# Patient Record
Sex: FEMALE | Race: WHITE | NOT HISPANIC OR LATINO | Employment: UNEMPLOYED | URBAN - METROPOLITAN AREA
[De-identification: names, ages, dates, MRNs, and addresses within clinical notes are randomized per-mention and may not be internally consistent; named-entity substitution may affect disease eponyms.]

---

## 2023-03-14 LAB
ALANINE AMINOTRANSFERASE (SGPT) (U/L) IN SER/PLAS: 34 U/L (ref 7–45)
ALBUMIN (G/DL) IN SER/PLAS: 3.2 G/DL (ref 3.4–5)
ALKALINE PHOSPHATASE (U/L) IN SER/PLAS: 128 U/L (ref 33–110)
ANION GAP IN SER/PLAS: 10 MMOL/L (ref 10–20)
ASPARTATE AMINOTRANSFERASE (SGOT) (U/L) IN SER/PLAS: 24 U/L (ref 9–39)
BILIRUBIN TOTAL (MG/DL) IN SER/PLAS: 0.3 MG/DL (ref 0–1.2)
CALCIUM (MG/DL) IN SER/PLAS: 9.3 MG/DL (ref 8.6–10.3)
CARBON DIOXIDE, TOTAL (MMOL/L) IN SER/PLAS: 23 MMOL/L (ref 21–32)
CHLORIDE (MMOL/L) IN SER/PLAS: 107 MMOL/L (ref 98–107)
CREATININE (MG/DL) IN SER/PLAS: 0.5 MG/DL (ref 0.5–1.05)
ERYTHROCYTE DISTRIBUTION WIDTH (RATIO) BY AUTOMATED COUNT: 14.2 % (ref 11.5–14.5)
ERYTHROCYTE MEAN CORPUSCULAR HEMOGLOBIN CONCENTRATION (G/DL) BY AUTOMATED: 31.8 G/DL (ref 32–36)
ERYTHROCYTE MEAN CORPUSCULAR VOLUME (FL) BY AUTOMATED COUNT: 87 FL (ref 80–100)
ERYTHROCYTES (10*6/UL) IN BLOOD BY AUTOMATED COUNT: 3.87 X10E12/L (ref 4–5.2)
GFR FEMALE: >90 ML/MIN/1.73M2
GLUCOSE (MG/DL) IN SER/PLAS: 70 MG/DL (ref 74–99)
HEMATOCRIT (%) IN BLOOD BY AUTOMATED COUNT: 33.6 % (ref 36–46)
HEMOGLOBIN (G/DL) IN BLOOD: 10.7 G/DL (ref 12–16)
LEUKOCYTES (10*3/UL) IN BLOOD BY AUTOMATED COUNT: 11.2 X10E9/L (ref 4.4–11.3)
PLATELETS (10*3/UL) IN BLOOD AUTOMATED COUNT: 282 X10E9/L (ref 150–450)
POTASSIUM (MMOL/L) IN SER/PLAS: 4.1 MMOL/L (ref 3.5–5.3)
PROTEIN TOTAL: 6.6 G/DL (ref 6.4–8.2)
SODIUM (MMOL/L) IN SER/PLAS: 136 MMOL/L (ref 136–145)
UREA NITROGEN (MG/DL) IN SER/PLAS: 7 MG/DL (ref 6–23)

## 2023-03-17 LAB — BILE ACID: 14 UMOL/L (ref 0–10)

## 2023-03-23 LAB
GROUP B STREP SCREEN: NORMAL
Lab: 1.6 UMOL/L
Lab: 1.7 UMOL/L
Lab: 6.6 UMOL/L
Lab: 9.8 UMOL/L

## 2023-11-21 ENCOUNTER — OFFICE VISIT (OUTPATIENT)
Dept: PRIMARY CARE | Facility: CLINIC | Age: 28
End: 2023-11-21
Payer: COMMERCIAL

## 2023-11-21 VITALS
OXYGEN SATURATION: 100 % | WEIGHT: 222 LBS | DIASTOLIC BLOOD PRESSURE: 68 MMHG | TEMPERATURE: 98.1 F | BODY MASS INDEX: 36.99 KG/M2 | HEART RATE: 71 BPM | SYSTOLIC BLOOD PRESSURE: 122 MMHG | HEIGHT: 65 IN

## 2023-11-21 DIAGNOSIS — Z13.29 SCREENING FOR THYROID DISORDER: ICD-10-CM

## 2023-11-21 DIAGNOSIS — Z13.21 ENCOUNTER FOR VITAMIN DEFICIENCY SCREENING: ICD-10-CM

## 2023-11-21 DIAGNOSIS — Z13.1 SCREENING FOR DIABETES MELLITUS: ICD-10-CM

## 2023-11-21 DIAGNOSIS — Z13.220 SCREENING FOR CHOLESTEROL LEVEL: ICD-10-CM

## 2023-11-21 DIAGNOSIS — F32.1 CURRENT MODERATE EPISODE OF MAJOR DEPRESSIVE DISORDER WITHOUT PRIOR EPISODE (MULTI): ICD-10-CM

## 2023-11-21 DIAGNOSIS — E66.09 CLASS 2 OBESITY DUE TO EXCESS CALORIES WITH BODY MASS INDEX (BMI) OF 36.0 TO 36.9 IN ADULT, UNSPECIFIED WHETHER SERIOUS COMORBIDITY PRESENT: Primary | ICD-10-CM

## 2023-11-21 PROCEDURE — 99214 OFFICE O/P EST MOD 30 MIN: CPT | Performed by: FAMILY MEDICINE

## 2023-11-21 PROCEDURE — 1036F TOBACCO NON-USER: CPT | Performed by: FAMILY MEDICINE

## 2023-11-21 PROCEDURE — 3008F BODY MASS INDEX DOCD: CPT | Performed by: FAMILY MEDICINE

## 2023-11-21 RX ORDER — FLUVOXAMINE MALEATE 25 MG/1
TABLET ORAL
Qty: 40 TABLET | Refills: 0 | Status: SHIPPED | OUTPATIENT
Start: 2023-11-21 | End: 2023-12-21 | Stop reason: WASHOUT

## 2023-11-21 ASSESSMENT — PATIENT HEALTH QUESTIONNAIRE - PHQ9
3. TROUBLE FALLING OR STAYING ASLEEP OR SLEEPING TOO MUCH: NEARLY EVERY DAY
2. FEELING DOWN, DEPRESSED OR HOPELESS: NEARLY EVERY DAY
10. IF YOU CHECKED OFF ANY PROBLEMS, HOW DIFFICULT HAVE THESE PROBLEMS MADE IT FOR YOU TO DO YOUR WORK, TAKE CARE OF THINGS AT HOME, OR GET ALONG WITH OTHER PEOPLE: VERY DIFFICULT
1. LITTLE INTEREST OR PLEASURE IN DOING THINGS: NEARLY EVERY DAY
9. THOUGHTS THAT YOU WOULD BE BETTER OFF DEAD, OR OF HURTING YOURSELF: NOT AT ALL
SUM OF ALL RESPONSES TO PHQ QUESTIONS 1-9: 17
7. TROUBLE CONCENTRATING ON THINGS, SUCH AS READING THE NEWSPAPER OR WATCHING TELEVISION: NOT AT ALL
6. FEELING BAD ABOUT YOURSELF - OR THAT YOU ARE A FAILURE OR HAVE LET YOURSELF OR YOUR FAMILY DOWN: MORE THAN HALF THE DAYS
8. MOVING OR SPEAKING SO SLOWLY THAT OTHER PEOPLE COULD HAVE NOTICED. OR THE OPPOSITE, BEING SO FIGETY OR RESTLESS THAT YOU HAVE BEEN MOVING AROUND A LOT MORE THAN USUAL: NOT AT ALL
SUM OF ALL RESPONSES TO PHQ9 QUESTIONS 1 AND 2: 6
5. POOR APPETITE OR OVEREATING: NEARLY EVERY DAY
4. FEELING TIRED OR HAVING LITTLE ENERGY: NEARLY EVERY DAY

## 2023-11-21 NOTE — PROGRESS NOTES
Assessment     ASSESSMENT/PLAN:      Problem List Items Addressed This Visit    None  Visit Diagnoses       Class 2 obesity due to excess calories with body mass index (BMI) of 36.0 to 36.9 in adult, unspecified whether serious comorbidity present    -  Primary    Encounter for vitamin deficiency screening        Relevant Orders    Vitamin D 25-Hydroxy,Total (for eval of Vitamin D levels)    Screening for diabetes mellitus        Relevant Orders    Basic Metabolic Panel    Hemoglobin A1C    Screening for thyroid disorder        Relevant Orders    TSH with reflex to Free T4 if abnormal    Screening for cholesterol level        Relevant Orders    Lipid Panel    Current moderate episode of major depressive disorder without prior episode (CMS/[facility])        Relevant Medications    fLuvoxaMINE (Luvox) 25 mg tablet            Patient Instructions:  Patient Instructions   If you are experiencing thoughts of hurting yourself or hurting someone else please use the following resources:   Crisis  Hotline (057) 822-0640  [facility] Crisis Intervention and Recovery Center [phone]  [facility] Suicide & Crisis Lifeline - 9-8-8 or 1-681.629.3941 (TALK)  [address] First Call for Help -   2-1-1, National [place]  on Mental Illness - (606) 339-5444 info@[place].org  24/7 Peer Support Warmline: 777.414.6652  Crisis Text Line: Text keyword 4hnjm to [id number]         Signed by: Bassem Umaña DO       FUTURE DIRECTION:   []    Subjective   SUBJECTIVE:     HPI : Patient is a 28 y.o. female who presents today for the following:     Mood  -States that she has gained significant amount of weight after previous pregnancy   - states she is 7 month postpartum   - mentions binge eating, overall has poor diet   - she feels like things are very difficulty for her and she feels overwhelmed       Review of Systems    Past Medical History:   Diagnosis Date    Candidiasis, unspecified 03/26/2021    Yeast  infection    Encounter for contraceptive management, unspecified 03/10/2020    Contraception management    Encounter for immunization 10/22/2019    Encounter for vaccination    Encounter for routine postpartum follow-up 03/10/2020    Postpartum exam    Encounter for supervision of normal pregnancy, unspecified, first trimester 2022    First trimester pregnancy    Hemorrhage in early pregnancy, unspecified 2022    Vaginal bleeding affecting early pregnancy    Other prurigo 2019    Pruritic rash    Other specified health status     No pertinent past medical history    Personal history of other complications of pregnancy, childbirth and the puerperium 2022    History of threatened     Personal history of other specified conditions 2022    History of headache    Personal history of other specified conditions 10/22/2019    History of urinary frequency    Personal history of urinary (tract) infections 10/21/2019    History of urinary tract infection        Past Surgical History:   Procedure Laterality Date    OTHER SURGICAL HISTORY  10/21/2019    [place] tooth extraction    OTHER SURGICAL HISTORY  2022     section low transverse        Current Outpatient Medications   Medication Instructions    fLuvoxaMINE (Luvox) 25 mg tablet Take 1 tablet daily for 1 week then increase to 2 tablet daily        No Known Allergies     Social History     Socioeconomic History    Marital status:      Spouse name: Not on file    Number of children: Not on file    Years of education: Not on file    Highest education level: Not on file   Occupational History    Not on file   Tobacco Use    Smoking status: Never    Smokeless tobacco: Never   Substance and Sexual Activity    Alcohol use: Not on file    Drug use: Not on file    Sexual activity: Not on file   Other Topics Concern    Not on file   Social History Narrative    Not on file     Social Determinants of Health     Financial  "Resource Strain: Not on file   Food Insecurity: Not on file   Transportation Needs: Not on file   Physical Activity: Not on file   Stress: Not on file   Social Connections: Not on file   Intimate Partner Violence: Not on file   Housing Stability: Not on file        No family history on file.     Objective     OBJECTIVE:     Vitals:    11/21/23 1607   BP: 122/68   Pulse: 71   Temp: 36.7 °C (98.1 °F)   SpO2: 100%   Weight: 101 kg (222 lb)   Height: 1.651 m (5' 5\")        Physical Exam  Constitutional:       Appearance: Normal appearance.   HENT:      Head: Normocephalic.   Pulmonary:      Effort: Pulmonary effort is normal.   Musculoskeletal:      Cervical back: Normal range of motion.   Neurological:      Mental Status: She is alert.   Psychiatric:         Mood and Affect: Mood is depressed. Affect is tearful.         Over the past 2 weeks, how often have you been bothered by any of the following problems?  Little interest or pleasure in doing things: Nearly every day  Feeling down, depressed, or hopeless: Nearly every day  Trouble falling or staying asleep, or sleeping too much: Nearly every day  Feeling tired or having little energy: Nearly every day  Poor appetite or overeating: Nearly every day  Feeling bad about yourself - or that you are a failure or have let yourself or your family down: More than half the days  Trouble concentrating on things, such as reading the newspaper or watching television: Not at all  Moving or speaking so slowly that other people could have noticed? Or the opposite - being so fidgety or restless that you have been moving around a lot more than usual.: Not at all  Thoughts that you would be better off dead or hurting yourself in some way: Not at all  Patient Health Questionnaire-9 Score: 17       "

## 2023-11-21 NOTE — PATIENT INSTRUCTIONS
If you are experiencing thoughts of hurting yourself or hurting someone else please use the following resources:   Crisis  Hotline (872) [phone]  [facility] Crisis Intervention and Recovery Center ([phone]  [employer] Suicide & Crisis Lifeline - 9-8-8 or 1-686.877.4721 (TALK)  [facility] First Call for Help -   2-1-1, National [place]  on Mental Illness - (441) 535-3947 info@[place].org  24/7 Peer Support Warmline: 831.523.8408  Crisis Text Line: Text keyword 4hope to 512061

## 2023-11-28 ENCOUNTER — APPOINTMENT (OUTPATIENT)
Dept: PRIMARY CARE | Facility: CLINIC | Age: 28
End: 2023-11-28
Payer: COMMERCIAL

## 2023-12-12 DIAGNOSIS — F32.1 CURRENT MODERATE EPISODE OF MAJOR DEPRESSIVE DISORDER WITHOUT PRIOR EPISODE (MULTI): ICD-10-CM

## 2023-12-12 RX ORDER — FLUVOXAMINE MALEATE 25 MG/1
TABLET ORAL
Qty: 40 TABLET | Refills: 0 | OUTPATIENT
Start: 2023-12-12

## 2023-12-21 ENCOUNTER — OFFICE VISIT (OUTPATIENT)
Dept: PRIMARY CARE | Facility: CLINIC | Age: 28
End: 2023-12-21
Payer: COMMERCIAL

## 2023-12-21 ENCOUNTER — LAB (OUTPATIENT)
Dept: LAB | Facility: LAB | Age: 28
End: 2023-12-21
Payer: COMMERCIAL

## 2023-12-21 VITALS
HEART RATE: 71 BPM | WEIGHT: 221 LBS | SYSTOLIC BLOOD PRESSURE: 114 MMHG | TEMPERATURE: 97.6 F | OXYGEN SATURATION: 97 % | DIASTOLIC BLOOD PRESSURE: 62 MMHG | BODY MASS INDEX: 36.78 KG/M2

## 2023-12-21 DIAGNOSIS — F32.1 CURRENT MODERATE EPISODE OF MAJOR DEPRESSIVE DISORDER WITHOUT PRIOR EPISODE (MULTI): Primary | ICD-10-CM

## 2023-12-21 DIAGNOSIS — E66.09 CLASS 2 OBESITY DUE TO EXCESS CALORIES WITH BODY MASS INDEX (BMI) OF 36.0 TO 36.9 IN ADULT, UNSPECIFIED WHETHER SERIOUS COMORBIDITY PRESENT: ICD-10-CM

## 2023-12-21 DIAGNOSIS — Z13.1 SCREENING FOR DIABETES MELLITUS: ICD-10-CM

## 2023-12-21 DIAGNOSIS — Z13.21 ENCOUNTER FOR VITAMIN DEFICIENCY SCREENING: ICD-10-CM

## 2023-12-21 DIAGNOSIS — Z13.29 SCREENING FOR THYROID DISORDER: ICD-10-CM

## 2023-12-21 DIAGNOSIS — Z13.220 SCREENING FOR CHOLESTEROL LEVEL: ICD-10-CM

## 2023-12-21 LAB
25(OH)D3 SERPL-MCNC: 14 NG/ML (ref 30–100)
ANION GAP SERPL CALC-SCNC: 10 MMOL/L (ref 10–20)
BUN SERPL-MCNC: 8 MG/DL (ref 6–23)
CALCIUM SERPL-MCNC: 9.3 MG/DL (ref 8.6–10.3)
CHLORIDE SERPL-SCNC: 106 MMOL/L (ref 98–107)
CHOLEST SERPL-MCNC: 156 MG/DL (ref 0–199)
CHOLESTEROL/HDL RATIO: 3.6
CO2 SERPL-SCNC: 28 MMOL/L (ref 21–32)
CREAT SERPL-MCNC: 0.66 MG/DL (ref 0.5–1.05)
GFR SERPL CREATININE-BSD FRML MDRD: >90 ML/MIN/1.73M*2
GLUCOSE SERPL-MCNC: 89 MG/DL (ref 74–99)
HDLC SERPL-MCNC: 43.9 MG/DL
LDLC SERPL CALC-MCNC: 77 MG/DL
NON HDL CHOLESTEROL: 112 MG/DL (ref 0–149)
POTASSIUM SERPL-SCNC: 4.5 MMOL/L (ref 3.5–5.3)
SODIUM SERPL-SCNC: 139 MMOL/L (ref 136–145)
T4 FREE SERPL-MCNC: 0.64 NG/DL (ref 0.61–1.12)
TRIGL SERPL-MCNC: 176 MG/DL (ref 0–149)
TSH SERPL-ACNC: 4.46 MIU/L (ref 0.44–3.98)
VLDL: 35 MG/DL (ref 0–40)

## 2023-12-21 PROCEDURE — 1036F TOBACCO NON-USER: CPT | Performed by: FAMILY MEDICINE

## 2023-12-21 PROCEDURE — 3008F BODY MASS INDEX DOCD: CPT | Performed by: FAMILY MEDICINE

## 2023-12-21 PROCEDURE — 84439 ASSAY OF FREE THYROXINE: CPT

## 2023-12-21 PROCEDURE — 83036 HEMOGLOBIN GLYCOSYLATED A1C: CPT

## 2023-12-21 PROCEDURE — 80061 LIPID PANEL: CPT

## 2023-12-21 PROCEDURE — 80048 BASIC METABOLIC PNL TOTAL CA: CPT

## 2023-12-21 PROCEDURE — 82306 VITAMIN D 25 HYDROXY: CPT

## 2023-12-21 PROCEDURE — 99213 OFFICE O/P EST LOW 20 MIN: CPT | Performed by: FAMILY MEDICINE

## 2023-12-21 PROCEDURE — 36415 COLL VENOUS BLD VENIPUNCTURE: CPT

## 2023-12-21 PROCEDURE — 84443 ASSAY THYROID STIM HORMONE: CPT

## 2023-12-21 RX ORDER — FLUVOXAMINE MALEATE 50 MG/1
50 TABLET, FILM COATED ORAL DAILY
Qty: 90 TABLET | Refills: 0 | Status: SHIPPED | OUTPATIENT
Start: 2023-12-21 | End: 2024-03-21 | Stop reason: DRUGHIGH

## 2023-12-21 ASSESSMENT — PATIENT HEALTH QUESTIONNAIRE - PHQ9
2. FEELING DOWN, DEPRESSED OR HOPELESS: SEVERAL DAYS
SUM OF ALL RESPONSES TO PHQ9 QUESTIONS 1 AND 2: 2
1. LITTLE INTEREST OR PLEASURE IN DOING THINGS: SEVERAL DAYS

## 2023-12-21 NOTE — PROGRESS NOTES
Assessment     ASSESSMENT/PLAN:      Problem List Items Addressed This Visit    None  Visit Diagnoses       Current moderate episode of major depressive disorder without prior episode (CMS/[facility])    -  Primary    Relevant Medications    fLuvoxaMINE (Luvox) 50 mg tablet    Class 2 obesity due to excess calories with body mass index (BMI) of 36.0 to 36.9 in adult, unspecified whether serious comorbidity present              Patient Instructions:  Patient Instructions   Continue Luvox 50mg, take with food   Add meat to plantbased meals   Continue exercising         Signed by: Bassem Umaña DO       FUTURE DIRECTION:   []    Subjective   SUBJECTIVE:     HPI : Patient is a 28 y.o. female who presents today for the following:     Depression   - mood has improved since being on luvox, did notice nausea when taking it at night       Review of Systems    Past Medical History:   Diagnosis Date    Candidiasis, unspecified 2021    Yeast infection    Encounter for contraceptive management, unspecified 03/10/2020    Contraception management    Encounter for immunization 10/22/2019    Encounter for vaccination    Encounter for routine postpartum follow-up 03/10/2020    Postpartum exam    Encounter for supervision of normal pregnancy, unspecified, first trimester 2022    First trimester pregnancy    Hemorrhage in early pregnancy, unspecified 2022    Vaginal bleeding affecting early pregnancy    Other prurigo 2019    Pruritic rash    Other specified health status     No pertinent past medical history    Personal history of other complications of pregnancy, childbirth and the puerperium 2022    History of threatened     Personal history of other specified conditions 2022    History of headache    Personal history of other specified conditions 10/22/2019    History of urinary frequency    Personal history of urinary (tract) infections 10/21/2019    History of urinary tract infection         Past Surgical History:   Procedure Laterality Date    OTHER SURGICAL HISTORY  10/21/2019    [place] tooth extraction    OTHER SURGICAL HISTORY  2022     section low transverse        Current Outpatient Medications   Medication Instructions    fLuvoxaMINE (LUVOX) 50 mg, oral, Daily        No Known Allergies     Social History     Socioeconomic History    Marital status:      Spouse name: Not on file    Number of children: Not on file    Years of education: Not on file    Highest education level: Not on file   Occupational History    Not on file   Tobacco Use    Smoking status: Never    Smokeless tobacco: Never   Substance and Sexual Activity    Alcohol use: Not on file    Drug use: Not on file    Sexual activity: Not on file   Other Topics Concern    Not on file   Social History Narrative    Not on file     Social Determinants of Health     Financial Resource Strain: Not on file   Food Insecurity: Not on file   Transportation Needs: Not on file   Physical Activity: Not on file   Stress: Not on file   Social Connections: Not on file   Intimate Partner Violence: Not on file   Housing Stability: Not on file        No family history on file.     Objective     OBJECTIVE:     Vitals:    23 0951   BP: 114/62   Pulse: 71   Temp: 36.4 °C (97.6 °F)   SpO2: 97%   Weight: 100 kg (221 lb)        Physical Exam  Constitutional:       Appearance: Normal appearance.   HENT:      Head: Normocephalic.   Pulmonary:      Effort: Pulmonary effort is normal.   Musculoskeletal:      Cervical back: Normal range of motion.   Neurological:      Mental Status: She is alert.   Psychiatric:         Mood and Affect: Mood normal.         Over the past 2 weeks, how often have you been bothered by any of the following problems?  Little interest or pleasure in doing things: Several days  Feeling down, depressed, or hopeless: Several days

## 2023-12-22 ENCOUNTER — TELEPHONE (OUTPATIENT)
Dept: PRIMARY CARE | Facility: CLINIC | Age: 28
End: 2023-12-22
Payer: COMMERCIAL

## 2023-12-22 DIAGNOSIS — E56.9 VITAMIN DEFICIENCY: Primary | ICD-10-CM

## 2023-12-22 LAB
EST. AVERAGE GLUCOSE BLD GHB EST-MCNC: 117 MG/DL
HBA1C MFR BLD: 5.7 %

## 2023-12-22 RX ORDER — ERGOCALCIFEROL 1.25 MG/1
50000 CAPSULE ORAL
Qty: 12 CAPSULE | Refills: 0 | Status: SHIPPED | OUTPATIENT
Start: 2023-12-22 | End: 2024-03-21 | Stop reason: ALTCHOICE

## 2023-12-22 NOTE — TELEPHONE ENCOUNTER
----- Message from Bassem Umaña DO sent at 12/22/2023  1:34 PM EST -----  Please let patient know the following:  A1c and triglycerides slightly elevated from 5.5-5.7, continue with diet modifications discussed at last OV  Vitamin D is low, will order 12-week supplement.  Once 12-week supplement has been completed recommend starting 1000 units of over-the-counter vitamin D supplement

## 2024-03-11 ENCOUNTER — OFFICE VISIT (OUTPATIENT)
Dept: PRIMARY CARE | Facility: CLINIC | Age: 29
End: 2024-03-11
Payer: COMMERCIAL

## 2024-03-11 VITALS
SYSTOLIC BLOOD PRESSURE: 116 MMHG | TEMPERATURE: 97.6 F | BODY MASS INDEX: 35.78 KG/M2 | HEART RATE: 77 BPM | OXYGEN SATURATION: 99 % | WEIGHT: 215 LBS | DIASTOLIC BLOOD PRESSURE: 76 MMHG

## 2024-03-11 DIAGNOSIS — R31.9 HEMATURIA, UNSPECIFIED TYPE: ICD-10-CM

## 2024-03-11 LAB
POC APPEARANCE, URINE: CLEAR
POC BILIRUBIN, URINE: NEGATIVE
POC BLOOD, URINE: ABNORMAL
POC COLOR, URINE: YELLOW
POC GLUCOSE, URINE: NEGATIVE MG/DL
POC KETONES, URINE: NEGATIVE MG/DL
POC LEUKOCYTES, URINE: ABNORMAL
POC NITRITE,URINE: NEGATIVE
POC PH, URINE: 6 PH
POC PROTEIN, URINE: NEGATIVE MG/DL
POC SPECIFIC GRAVITY, URINE: 1.02
POC UROBILINOGEN, URINE: 0.2 EU/DL

## 2024-03-11 PROCEDURE — 87186 SC STD MICRODIL/AGAR DIL: CPT

## 2024-03-11 PROCEDURE — 99214 OFFICE O/P EST MOD 30 MIN: CPT | Performed by: FAMILY MEDICINE

## 2024-03-11 PROCEDURE — 1036F TOBACCO NON-USER: CPT | Performed by: FAMILY MEDICINE

## 2024-03-11 PROCEDURE — 81003 URINALYSIS AUTO W/O SCOPE: CPT | Performed by: FAMILY MEDICINE

## 2024-03-11 PROCEDURE — 3008F BODY MASS INDEX DOCD: CPT | Performed by: FAMILY MEDICINE

## 2024-03-11 PROCEDURE — 87086 URINE CULTURE/COLONY COUNT: CPT

## 2024-03-11 RX ORDER — PHENAZOPYRIDINE HYDROCHLORIDE 100 MG/1
100 TABLET, FILM COATED ORAL 3 TIMES DAILY
Qty: 6 TABLET | Refills: 0 | Status: SHIPPED | OUTPATIENT
Start: 2024-03-11 | End: 2024-03-13

## 2024-03-11 RX ORDER — SULFAMETHOXAZOLE AND TRIMETHOPRIM 800; 160 MG/1; MG/1
1 TABLET ORAL 2 TIMES DAILY
Qty: 6 TABLET | Refills: 0 | Status: SHIPPED | OUTPATIENT
Start: 2024-03-11 | End: 2024-03-11 | Stop reason: ENTERED-IN-ERROR

## 2024-03-11 RX ORDER — AMOXICILLIN AND CLAVULANATE POTASSIUM 875; 125 MG/1; MG/1
875 TABLET, FILM COATED ORAL 2 TIMES DAILY
Qty: 20 TABLET | Refills: 0 | Status: SHIPPED | OUTPATIENT
Start: 2024-03-11 | End: 2024-03-21 | Stop reason: WASHOUT

## 2024-03-11 ASSESSMENT — ENCOUNTER SYMPTOMS
FATIGUE: 0
HEMATURIA: 1
VOMITING: 0
ABDOMINAL PAIN: 0
MYALGIAS: 0
DYSURIA: 1
DIFFICULTY URINATING: 0
HEADACHES: 0
BACK PAIN: 0
CHILLS: 0
PALPITATIONS: 0
DIZZINESS: 0
BLOOD IN STOOL: 0
NAUSEA: 0
DIARRHEA: 0
SHORTNESS OF BREATH: 0
CONSTIPATION: 0

## 2024-03-11 NOTE — PROGRESS NOTES
Subjective   Patient ID: Sera Yepez is a 28 y.o. female who presents for Difficulty Urinating (Burning with urination x1 day) and Blood in Urine (Blood in urine x1 day).    Difficulty Urinating   Associated symptoms include hematuria. Pertinent negatives include no chills, nausea or vomiting.   Blood in Urine  Associated symptoms include dysuria. Pertinent negatives include no abdominal pain, chills, nausea or vomiting.      Dysuria: onset x 1 day. Noticing bld when wiping and in urine. No F/Chills/abd pain/n/V/D/C. Sexually active, no change of preg. No h/o kidney stones. No recent abx    Review of Systems   Constitutional:  Negative for chills and fatigue.   Eyes:  Negative for visual disturbance.   Respiratory:  Negative for shortness of breath.    Cardiovascular:  Negative for chest pain and palpitations.   Gastrointestinal:  Negative for abdominal pain, blood in stool, constipation, diarrhea, nausea and vomiting.   Genitourinary:  Positive for dysuria and hematuria. Negative for difficulty urinating.   Musculoskeletal:  Negative for back pain and myalgias.   Skin:  Negative for rash.   Neurological:  Negative for dizziness and headaches.       Objective   /76   Pulse 77   Temp 36.4 °C (97.6 °F)   Wt 97.5 kg (215 lb)   SpO2 99%   BMI 35.78 kg/m²     Physical Exam  Vitals and nursing note reviewed.   Constitutional:       General: She is not in acute distress.     Appearance: Normal appearance. She is not toxic-appearing.   HENT:      Head: Normocephalic.   Eyes:      Pupils: Pupils are equal, round, and reactive to light.   Cardiovascular:      Rate and Rhythm: Normal rate and regular rhythm.      Heart sounds: No murmur heard.  Pulmonary:      Effort: Pulmonary effort is normal. No respiratory distress.      Breath sounds: Normal breath sounds.   Abdominal:      General: Bowel sounds are normal.      Palpations: Abdomen is soft.      Tenderness: There is no abdominal tenderness. There is no  right CVA tenderness, left CVA tenderness or guarding.   Musculoskeletal:      Right lower leg: No edema.      Left lower leg: No edema.   Skin:     General: Skin is warm.   Neurological:      General: No focal deficit present.      Mental Status: She is alert.      Cranial Nerves: No cranial nerve deficit.   Psychiatric:         Mood and Affect: Mood normal.         Assessment/Plan   Problem List Items Addressed This Visit    None  Visit Diagnoses         Codes    Hematuria, unspecified type     R31.9    Relevant Medications    amoxicillin-pot clavulanate (Augmentin) 875-125 mg tablet    phenazopyridine (Pyridium) 100 mg tablet    Other Relevant Orders    POCT UA Automated manually resulted (Completed)    Urine Culture        Sent Ucx. Discussed side effect of meds

## 2024-03-14 ENCOUNTER — TELEPHONE (OUTPATIENT)
Dept: PRIMARY CARE | Facility: CLINIC | Age: 29
End: 2024-03-14
Payer: COMMERCIAL

## 2024-03-14 LAB — BACTERIA UR CULT: ABNORMAL

## 2024-03-14 NOTE — TELEPHONE ENCOUNTER
Pt returned call- informed of provider appendage. Pt states she does feel better. No urinary sxs. CG

## 2024-03-14 NOTE — TELEPHONE ENCOUNTER
----- Message from Kimberly Hand DO sent at 3/14/2024  3:39 PM EDT -----  Pls tell pt that she has mild urine infection.  The antibiotic may not completely resolve it. If she is still having symptoms, let us know for alt med

## 2024-03-21 ENCOUNTER — OFFICE VISIT (OUTPATIENT)
Dept: PRIMARY CARE | Facility: CLINIC | Age: 29
End: 2024-03-21
Payer: COMMERCIAL

## 2024-03-21 VITALS
DIASTOLIC BLOOD PRESSURE: 70 MMHG | SYSTOLIC BLOOD PRESSURE: 114 MMHG | BODY MASS INDEX: 35.11 KG/M2 | TEMPERATURE: 97.5 F | OXYGEN SATURATION: 100 % | WEIGHT: 211 LBS | HEART RATE: 68 BPM

## 2024-03-21 DIAGNOSIS — E55.9 VITAMIN D DEFICIENCY: ICD-10-CM

## 2024-03-21 DIAGNOSIS — R73.03 PREDIABETES: ICD-10-CM

## 2024-03-21 DIAGNOSIS — R94.6 ABNORMAL THYROID EXAM: ICD-10-CM

## 2024-03-21 DIAGNOSIS — E66.09 CLASS 2 OBESITY DUE TO EXCESS CALORIES WITH BODY MASS INDEX (BMI) OF 36.0 TO 36.9 IN ADULT, UNSPECIFIED WHETHER SERIOUS COMORBIDITY PRESENT: Primary | ICD-10-CM

## 2024-03-21 DIAGNOSIS — F32.1 CURRENT MODERATE EPISODE OF MAJOR DEPRESSIVE DISORDER WITHOUT PRIOR EPISODE (MULTI): ICD-10-CM

## 2024-03-21 PROCEDURE — 1036F TOBACCO NON-USER: CPT | Performed by: FAMILY MEDICINE

## 2024-03-21 PROCEDURE — 99213 OFFICE O/P EST LOW 20 MIN: CPT | Performed by: FAMILY MEDICINE

## 2024-03-21 PROCEDURE — 3008F BODY MASS INDEX DOCD: CPT | Performed by: FAMILY MEDICINE

## 2024-03-21 RX ORDER — FLUVOXAMINE MALEATE 25 MG/1
25 TABLET ORAL NIGHTLY
Qty: 30 TABLET | Refills: 5 | Status: SHIPPED | OUTPATIENT
Start: 2024-03-21 | End: 2024-09-17

## 2024-03-21 RX ORDER — SEMAGLUTIDE 0.25 MG/.5ML
0.25 INJECTION, SOLUTION SUBCUTANEOUS
Qty: 2 ML | Refills: 0 | Status: SHIPPED | OUTPATIENT
Start: 2024-03-21 | End: 2024-05-15 | Stop reason: SDUPTHER

## 2024-03-21 NOTE — PATIENT INSTRUCTIONS
1. Class 2 obesity due to excess calories with body mass index (BMI) of 36.0 to 36.9 in adult, unspecified whether serious comorbidity present  I believe patient will have significant improvement with weight with a weight program like weight watchers, referral placed, did discuss possibly getting semaglutide 0.25 mg ordered however need to wait for insurance approval  - semaglutide, weight loss, (Wegovy) 0.25 mg/0.5 mL pen injector; Inject 0.25 mg under the skin 1 (one) time per week for 4 doses.  Dispense: 2 mL; Refill: 0  - Referral to Weight Watchers; Future    2. Current moderate episode of major depressive disorder without prior episode (CMS/HCC)  Patient has noticed improvement with Luvox 25 mg, decrease dose from 50.  Refill sent  - fLuvoxaMINE (Luvox) 25 mg tablet; Take 1 tablet (25 mg) by mouth once daily at bedtime.  Dispense: 30 tablet; Refill: 5    3. Prediabetes  Discussed information regarding diet  - Hemoglobin A1c; Future    4. Abnormal thyroid exam  Advised patient that weight issue could be due to thyroid, will recheck thyroid levels.  - Tsh With Reflex To Free T4 If Abnormal; Future    5. Vitamin D deficiency  Also will recheck vitamin D levels  - Vitamin D 25-Hydroxy,Total (for eval of Vitamin D levels); Future

## 2024-03-21 NOTE — PROGRESS NOTES
Assessment     ASSESSMENT/PLAN:      Patient Instructions   1. Class 2 obesity due to excess calories with body mass index (BMI) of 36.0 to 36.9 in adult, unspecified whether serious comorbidity present  I believe patient will have significant improvement with weight with a weight program like weight watchers, referral placed, did discuss possibly getting semaglutide 0.25 mg ordered however need to wait for insurance approval  - semaglutide, weight loss, (Wegovy) 0.25 mg/0.5 mL pen injector; Inject 0.25 mg under the skin 1 (one) time per week for 4 doses.  Dispense: 2 mL; Refill: 0  - Referral to Weight Watchers; Future    2. Current moderate episode of major depressive disorder without prior episode (CMS/HCC)  Patient has noticed improvement with Luvox 25 mg, decrease dose from 50.  Refill sent  - fLuvoxaMINE (Luvox) 25 mg tablet; Take 1 tablet (25 mg) by mouth once daily at bedtime.  Dispense: 30 tablet; Refill: 5    3. Prediabetes  Discussed information regarding diet  - Hemoglobin A1c; Future    4. Abnormal thyroid exam  Advised patient that weight issue could be due to thyroid, will recheck thyroid levels.  - Tsh With Reflex To Free T4 If Abnormal; Future    5. Vitamin D deficiency  Also will recheck vitamin D levels  - Vitamin D 25-Hydroxy,Total (for eval of Vitamin D levels); Future           Signed by: Bassem Umaña DO       FUTURE DIRECTION:   []    Subjective   SUBJECTIVE:     HPI : Patient is a 28 y.o. female who presents today for the following:     Depression   mood has improved since being on luvox, did notice nausea when taking it at night     Abnormal thyroid labs   Pt mentions history of abnormal thyroid labs but never been on medication    Class II obesity  Patient states that she has been trying to increase physical activity and monitor diet however that has been challenging since she is in school and still taking care of her son  Her biggest issues is planning meals that are  healthy    Review of Systems    Past Medical History:   Diagnosis Date    Candidiasis, unspecified 2021    Yeast infection    Encounter for contraceptive management, unspecified 03/10/2020    Contraception management    Encounter for immunization 10/22/2019    Encounter for vaccination    Encounter for routine postpartum follow-up 03/10/2020    Postpartum exam    Encounter for supervision of normal pregnancy, unspecified, first trimester 2022    First trimester pregnancy    Hemorrhage in early pregnancy, unspecified 2022    Vaginal bleeding affecting early pregnancy    Other prurigo 2019    Pruritic rash    Other specified health status     No pertinent past medical history    Personal history of other complications of pregnancy, childbirth and the puerperium 2022    History of threatened     Personal history of other specified conditions 2022    History of headache    Personal history of other specified conditions 10/22/2019    History of urinary frequency    Personal history of urinary (tract) infections 10/21/2019    History of urinary tract infection        Past Surgical History:   Procedure Laterality Date    OTHER SURGICAL HISTORY  10/21/2019    [place] tooth extraction    OTHER SURGICAL HISTORY  2022     section low transverse        Current Outpatient Medications   Medication Instructions    amoxicillin-pot clavulanate (Augmentin) 875-125 mg tablet 875 mg, oral, 2 times daily    ergocalciferol (VITAMIN D-2) 50,000 Units, oral, Weekly    fLuvoxaMINE (LUVOX) 25 mg, oral, Nightly    Wegovy 0.25 mg, subcutaneous, Weekly        No Known Allergies     Social History     Socioeconomic History    Marital status:      Spouse name: Not on file    Number of children: Not on file    Years of education: Not on file    Highest education level: Not on file   Occupational History    Not on file   Tobacco Use    Smoking status: Never    Smokeless tobacco: Never    Vaping Use    Vaping Use: Never used   Substance and Sexual Activity    Alcohol use: Not Currently    Drug use: Never    Sexual activity: Not on file   Other Topics Concern    Not on file   Social History Narrative    Not on file     Social Determinants of Health     Financial Resource Strain: Not on file   Food Insecurity: Not on file   Transportation Needs: Not on file   Physical Activity: Not on file   Stress: Not on file   Social Connections: Not on file   Intimate Partner Violence: Not on file   Housing Stability: Not on file        No family history on file.     Objective     OBJECTIVE:     Vitals:    03/21/24 0943   BP: 114/70   Pulse: 68   Temp: 36.4 °C (97.5 °F)   SpO2: 100%   Weight: 95.7 kg (211 lb)        Physical Exam  Constitutional:       Appearance: Normal appearance.   HENT:      Head: Normocephalic.   Pulmonary:      Effort: Pulmonary effort is normal.   Musculoskeletal:      Cervical back: Normal range of motion.   Neurological:      Mental Status: She is alert.   Psychiatric:         Mood and Affect: Mood normal.

## 2024-03-25 ENCOUNTER — TELEPHONE (OUTPATIENT)
Dept: PRIMARY CARE | Facility: CLINIC | Age: 29
End: 2024-03-25
Payer: COMMERCIAL

## 2024-03-26 ENCOUNTER — LAB (OUTPATIENT)
Dept: LAB | Facility: LAB | Age: 29
End: 2024-03-26
Payer: COMMERCIAL

## 2024-03-26 ENCOUNTER — TELEPHONE (OUTPATIENT)
Dept: PRIMARY CARE | Facility: CLINIC | Age: 29
End: 2024-03-26

## 2024-03-26 DIAGNOSIS — R73.03 PREDIABETES: ICD-10-CM

## 2024-03-26 DIAGNOSIS — E55.9 VITAMIN D DEFICIENCY: ICD-10-CM

## 2024-03-26 DIAGNOSIS — R94.6 ABNORMAL THYROID EXAM: ICD-10-CM

## 2024-03-26 LAB
25(OH)D3 SERPL-MCNC: 18 NG/ML (ref 30–100)
EST. AVERAGE GLUCOSE BLD GHB EST-MCNC: 126 MG/DL
HBA1C MFR BLD: 6 %
T4 FREE SERPL-MCNC: 0.71 NG/DL (ref 0.61–1.12)
TSH SERPL-ACNC: 5.58 MIU/L (ref 0.44–3.98)

## 2024-03-26 PROCEDURE — 84439 ASSAY OF FREE THYROXINE: CPT

## 2024-03-26 PROCEDURE — 83036 HEMOGLOBIN GLYCOSYLATED A1C: CPT

## 2024-03-26 PROCEDURE — 82306 VITAMIN D 25 HYDROXY: CPT

## 2024-03-26 PROCEDURE — 36415 COLL VENOUS BLD VENIPUNCTURE: CPT

## 2024-03-26 PROCEDURE — 84443 ASSAY THYROID STIM HORMONE: CPT

## 2024-03-27 ENCOUNTER — TELEPHONE (OUTPATIENT)
Dept: PRIMARY CARE | Facility: CLINIC | Age: 29
End: 2024-03-27
Payer: COMMERCIAL

## 2024-03-27 DIAGNOSIS — E03.8 SUBCLINICAL HYPOTHYROIDISM: ICD-10-CM

## 2024-03-27 DIAGNOSIS — E55.9 VITAMIN D DEFICIENCY: Primary | ICD-10-CM

## 2024-03-27 RX ORDER — LEVOTHYROXINE SODIUM 25 UG/1
12.5 TABLET ORAL DAILY
Qty: 30 TABLET | Refills: 0 | Status: SHIPPED | OUTPATIENT
Start: 2024-03-27 | End: 2024-04-18

## 2024-03-27 RX ORDER — ERGOCALCIFEROL 1.25 MG/1
50000 CAPSULE ORAL
Qty: 12 CAPSULE | Refills: 0 | Status: SHIPPED | OUTPATIENT
Start: 2024-03-27 | End: 2024-06-19

## 2024-03-27 NOTE — TELEPHONE ENCOUNTER
----- Message from Bassem Umaña DO sent at 3/27/2024 10:05 AM EDT -----  Please call patient and let then know the following:  Vitamin D is low, will order 12-week supplement.  Once 12-week supplement has been completed recommend starting 2000 units of over-the-counter vitamin D supplement  Thyroid levels are low normal, would recommend starting levothyroxine 12.5mg daily and recheck in 6 weeks. Low thyroid can cause issues with weight gain  A1c is in the prediabetic range. This can improve with low-carb diet and exercising 30 minutes daily. We started a PA for wegovy

## 2024-04-01 ENCOUNTER — TELEPHONE (OUTPATIENT)
Dept: PRIMARY CARE | Facility: CLINIC | Age: 29
End: 2024-04-01
Payer: COMMERCIAL

## 2024-04-01 NOTE — TELEPHONE ENCOUNTER
Pt called in regarding her PA. Per pt her insurance has nothing pending. On our end it states waiting for payer response. Will try to start auth again. Pt also gave phone number for auth. Will try that later if needed. PA phone number is [phone]. New PA requested. Thanks. SIRENA

## 2024-04-01 NOTE — TELEPHONE ENCOUNTER
Was starting PA process again. Looks like we still have [facility] in chart. Pt now has [employer]. I cannot process PA until we have correct insurance. Please call pt and get new insurance info. Thanks. JW

## 2024-04-10 ENCOUNTER — TELEPHONE (OUTPATIENT)
Dept: PRIMARY CARE | Facility: CLINIC | Age: 29
End: 2024-04-10
Payer: COMMERCIAL

## 2024-04-11 ENCOUNTER — PATIENT MESSAGE (OUTPATIENT)
Dept: PRIMARY CARE | Facility: CLINIC | Age: 29
End: 2024-04-11
Payer: COMMERCIAL

## 2024-04-18 DIAGNOSIS — E03.8 SUBCLINICAL HYPOTHYROIDISM: ICD-10-CM

## 2024-04-18 RX ORDER — LEVOTHYROXINE SODIUM 25 UG/1
TABLET ORAL DAILY
Qty: 45 TABLET | Refills: 1 | Status: SHIPPED | OUTPATIENT
Start: 2024-04-18

## 2024-04-23 ENCOUNTER — TELEPHONE (OUTPATIENT)
Dept: PRIMARY CARE | Facility: CLINIC | Age: 29
End: 2024-04-23
Payer: COMMERCIAL

## 2024-04-23 DIAGNOSIS — E66.09 CLASS 2 OBESITY DUE TO EXCESS CALORIES WITH BODY MASS INDEX (BMI) OF 36.0 TO 36.9 IN ADULT, UNSPECIFIED WHETHER SERIOUS COMORBIDITY PRESENT: ICD-10-CM

## 2024-04-23 RX ORDER — SEMAGLUTIDE 0.25 MG/.5ML
0.25 INJECTION, SOLUTION SUBCUTANEOUS
Qty: 2 ML | Refills: 0 | Status: CANCELLED | OUTPATIENT
Start: 2024-04-28 | End: 2024-05-20

## 2024-04-23 NOTE — TELEPHONE ENCOUNTER
"PA on pended med - DENIED    \"Pt has to have a BMI greater than 30kg/m2 and one or more weight related health issue\"      I did sent the note from 3/2024, it must not have been enough. Do you wish to appeal? If so:  [facility] Prior Auths  Ph - [phone]  - [phone]    Please advise Thx   "

## 2024-05-15 DIAGNOSIS — E66.09 CLASS 2 OBESITY DUE TO EXCESS CALORIES WITH BODY MASS INDEX (BMI) OF 36.0 TO 36.9 IN ADULT, UNSPECIFIED WHETHER SERIOUS COMORBIDITY PRESENT: ICD-10-CM

## 2024-05-15 RX ORDER — SEMAGLUTIDE 0.25 MG/.5ML
0.5 INJECTION, SOLUTION SUBCUTANEOUS
Qty: 2 ML | Refills: 0 | Status: SHIPPED | OUTPATIENT
Start: 2024-05-19 | End: 2024-12-09

## 2024-05-15 RX ORDER — SEMAGLUTIDE 0.25 MG/.5ML
0.5 INJECTION, SOLUTION SUBCUTANEOUS
Qty: 30 ML | Refills: 0 | Status: SHIPPED | OUTPATIENT
Start: 2024-05-19 | End: 2024-05-15

## 2024-05-15 NOTE — TELEPHONE ENCOUNTER
----- Message from Sera Yepez sent at 5/14/2024  5:17 PM EDT -----  Regarding: Wegovy  Contact: [phone]  Hello, I’m messaging because [facility] in order to order more of the prescription, I have 2 shots left, I just wasn’t sure how long it takes to get it in. Thank youu!

## 2024-07-01 DIAGNOSIS — E66.09 CLASS 2 OBESITY DUE TO EXCESS CALORIES WITH BODY MASS INDEX (BMI) OF 36.0 TO 36.9 IN ADULT, UNSPECIFIED WHETHER SERIOUS COMORBIDITY PRESENT: ICD-10-CM

## 2024-07-01 NOTE — TELEPHONE ENCOUNTER
Pt called Rx line stating she called her pharmacy about pended medication for a refill, they stated that new Rx needed to be sent. Please advise, AM

## 2024-07-02 DIAGNOSIS — R73.03 PREDIABETES: ICD-10-CM

## 2024-07-02 DIAGNOSIS — E66.09 CLASS 2 OBESITY DUE TO EXCESS CALORIES WITH BODY MASS INDEX (BMI) OF 36.0 TO 36.9 IN ADULT, UNSPECIFIED WHETHER SERIOUS COMORBIDITY PRESENT: Primary | ICD-10-CM

## 2024-07-02 RX ORDER — SEMAGLUTIDE 0.25 MG/.5ML
0.5 INJECTION, SOLUTION SUBCUTANEOUS
Qty: 2 ML | Refills: 0 | OUTPATIENT
Start: 2024-07-07 | End: 2025-01-27

## 2024-07-02 RX ORDER — SEMAGLUTIDE 1 MG/.5ML
1 INJECTION, SOLUTION SUBCUTANEOUS
Qty: 2 ML | Refills: 0 | Status: SHIPPED | OUTPATIENT
Start: 2024-07-07 | End: 2024-07-29

## 2024-07-24 ENCOUNTER — OFFICE VISIT (OUTPATIENT)
Dept: PRIMARY CARE | Facility: CLINIC | Age: 29
End: 2024-07-24
Payer: COMMERCIAL

## 2024-07-24 VITALS
BODY MASS INDEX: 30.45 KG/M2 | SYSTOLIC BLOOD PRESSURE: 94 MMHG | WEIGHT: 183 LBS | DIASTOLIC BLOOD PRESSURE: 58 MMHG | OXYGEN SATURATION: 99 % | HEART RATE: 78 BPM | TEMPERATURE: 98.1 F

## 2024-07-24 DIAGNOSIS — R10.11 RIGHT UPPER QUADRANT ABDOMINAL PAIN: Primary | ICD-10-CM

## 2024-07-24 DIAGNOSIS — E66.09 CLASS 1 OBESITY DUE TO EXCESS CALORIES WITHOUT SERIOUS COMORBIDITY WITH BODY MASS INDEX (BMI) OF 30.0 TO 30.9 IN ADULT: ICD-10-CM

## 2024-07-24 PROBLEM — D62 POSTOPERATIVE ANEMIA DUE TO ACUTE BLOOD LOSS: Status: ACTIVE | Noted: 2024-07-24

## 2024-07-24 PROBLEM — F12.20 CANNABIS DEPENDENCE (MULTI): Status: ACTIVE | Noted: 2024-07-24

## 2024-07-24 PROBLEM — F32.1 MAJOR DEPRESSIVE DISORDER, SINGLE EPISODE, MODERATE (MULTI): Status: ACTIVE | Noted: 2024-07-24

## 2024-07-24 PROBLEM — E66.811 CLASS 1 OBESITY DUE TO EXCESS CALORIES WITHOUT SERIOUS COMORBIDITY WITH BODY MASS INDEX (BMI) OF 30.0 TO 30.9 IN ADULT: Status: ACTIVE | Noted: 2024-07-24

## 2024-07-24 PROCEDURE — 99213 OFFICE O/P EST LOW 20 MIN: CPT | Performed by: FAMILY MEDICINE

## 2024-07-24 PROCEDURE — 1036F TOBACCO NON-USER: CPT | Performed by: FAMILY MEDICINE

## 2024-07-24 RX ORDER — ONDANSETRON 4 MG/1
4 TABLET, ORALLY DISINTEGRATING ORAL EVERY 8 HOURS PRN
Qty: 20 TABLET | Refills: 0 | Status: SHIPPED | OUTPATIENT
Start: 2024-07-24 | End: 2024-07-31

## 2024-07-24 RX ORDER — FAMOTIDINE 40 MG/1
40 TABLET, FILM COATED ORAL 2 TIMES DAILY
Qty: 60 TABLET | Refills: 1 | Status: SHIPPED | OUTPATIENT
Start: 2024-07-24 | End: 2024-09-22

## 2024-07-24 NOTE — PROGRESS NOTES
Assessment     ASSESSMENT/PLAN:      Patient Instructions   Abd pain: possible due to reflux, will try pepcid and use zofran nausea  ED precautions discussed. Pt advised to get labs done as well      Signed by: Bassem Umaña DO       FUTURE DIRECTION:   []    Subjective   SUBJECTIVE:     HPI : Patient is a 28 y.o. female who presents today for the following:     Abd pain   Located RUQ, similar to past gallbladder problems   Increased nausea and non bloody vomiting   Lost about 21 lbs since starting ozempic  States that increase to 1mg dose caused sx      Review of Systems    Past Medical History:   Diagnosis Date    Candidiasis, unspecified 2021    Yeast infection    Encounter for contraceptive management, unspecified 03/10/2020    Contraception management    Encounter for immunization 10/22/2019    Encounter for vaccination    Encounter for routine postpartum follow-up ([facility]) 03/10/2020    Postpartum exam    Encounter for supervision of normal pregnancy, unspecified, first trimester ([facility]) 2022    First trimester pregnancy    Hemorrhage in early pregnancy, unspecified ([facility]) 2022    Vaginal bleeding affecting early pregnancy    Other prurigo 2019    Pruritic rash    Other specified health status     No pertinent past medical history    Personal history of other complications of pregnancy, childbirth and the puerperium 2022    History of threatened     Personal history of other specified conditions 2022    History of headache    Personal history of other specified conditions 10/22/2019    History of urinary frequency    Personal history of urinary (tract) infections 10/21/2019    History of urinary tract infection        Past Surgical History:   Procedure Laterality Date    OTHER SURGICAL HISTORY  10/21/2019    [place] tooth extraction    OTHER SURGICAL HISTORY  2022     section low transverse        Current Outpatient Medications    Medication Instructions    famotidine (PEPCID) 40 mg, oral, 2 times daily    fLuvoxaMINE (LUVOX) 25 mg, oral, Nightly    levothyroxine (Synthroid, Levoxyl) 25 mcg tablet oral, Daily    ondansetron ODT (ZOFRAN-ODT) 4 mg, oral, Every 8 hours PRN    [START ON 7/28/2024] semaglutide 0.5 mg, subcutaneous, Once Weekly        No Known Allergies     Social History     Socioeconomic History    Marital status:      Spouse name: Not on file    Number of children: Not on file    Years of education: Not on file    Highest education level: Not on file   Occupational History    Not on file   Tobacco Use    Smoking status: Never    Smokeless tobacco: Never   Vaping Use    Vaping status: Never Used   Substance and Sexual Activity    Alcohol use: Not Currently    Drug use: Never    Sexual activity: Not on file   Other Topics Concern    Not on file   Social History Narrative    Not on file     Social Determinants of Health     Financial Resource Strain: Not on file   Food Insecurity: Not on file   Transportation Needs: Not on file   Physical Activity: Not on file   Stress: Not on file   Social Connections: Not on file   Intimate Partner Violence: Not on file   Housing Stability: Not on file        No family history on file.     Objective     OBJECTIVE:     Vitals:    07/24/24 1655   BP: 94/58   Pulse: 78   Temp: 36.7 °C (98.1 °F)   SpO2: 99%   Weight: 83 kg (183 lb)        Physical Exam  Constitutional:       Appearance: Normal appearance.   HENT:      Head: Normocephalic.   Pulmonary:      Effort: Pulmonary effort is normal.   Abdominal:      Tenderness: There is abdominal tenderness. There is no guarding or rebound.   Musculoskeletal:      Cervical back: Normal range of motion.   Neurological:      Mental Status: She is alert.   Psychiatric:         Mood and Affect: Mood normal.

## 2024-07-24 NOTE — PATIENT INSTRUCTIONS
Abd pain: possible due to reflux, will try pepcid and use zofran nausea  ED precautions discussed. Pt advised to get labs done as well

## 2024-08-15 DIAGNOSIS — R10.11 RIGHT UPPER QUADRANT ABDOMINAL PAIN: ICD-10-CM

## 2024-08-15 RX ORDER — FAMOTIDINE 40 MG/1
40 TABLET, FILM COATED ORAL 2 TIMES DAILY
Qty: 180 TABLET | Refills: 1 | OUTPATIENT
Start: 2024-08-15

## 2024-08-22 ENCOUNTER — OFFICE VISIT (OUTPATIENT)
Dept: PRIMARY CARE | Facility: CLINIC | Age: 29
End: 2024-08-22
Payer: COMMERCIAL

## 2024-08-22 VITALS
BODY MASS INDEX: 30.29 KG/M2 | OXYGEN SATURATION: 100 % | TEMPERATURE: 97.7 F | DIASTOLIC BLOOD PRESSURE: 82 MMHG | HEART RATE: 91 BPM | WEIGHT: 182 LBS | SYSTOLIC BLOOD PRESSURE: 126 MMHG

## 2024-08-22 DIAGNOSIS — E03.8 SUBCLINICAL HYPOTHYROIDISM: ICD-10-CM

## 2024-08-22 DIAGNOSIS — R10.11 RIGHT UPPER QUADRANT ABDOMINAL PAIN: ICD-10-CM

## 2024-08-22 DIAGNOSIS — F32.1 CURRENT MODERATE EPISODE OF MAJOR DEPRESSIVE DISORDER WITHOUT PRIOR EPISODE (MULTI): ICD-10-CM

## 2024-08-22 DIAGNOSIS — R73.03 PREDIABETES: Primary | ICD-10-CM

## 2024-08-22 PROCEDURE — 99214 OFFICE O/P EST MOD 30 MIN: CPT | Performed by: FAMILY MEDICINE

## 2024-08-22 PROCEDURE — 1036F TOBACCO NON-USER: CPT | Performed by: FAMILY MEDICINE

## 2024-08-22 RX ORDER — LEVOTHYROXINE SODIUM 25 UG/1
12.5 TABLET ORAL DAILY
Qty: 45 TABLET | Refills: 1 | Status: SHIPPED | OUTPATIENT
Start: 2024-08-22

## 2024-08-22 RX ORDER — FLUVOXAMINE MALEATE 25 MG/1
25 TABLET ORAL NIGHTLY
Qty: 90 TABLET | Refills: 1 | Status: SHIPPED | OUTPATIENT
Start: 2024-08-22 | End: 2025-02-18

## 2024-08-22 RX ORDER — SEMAGLUTIDE 1 MG/.5ML
1 INJECTION, SOLUTION SUBCUTANEOUS
Qty: 2 ML | Refills: 3 | Status: SHIPPED | OUTPATIENT
Start: 2024-08-22 | End: 2024-08-22 | Stop reason: SDUPTHER

## 2024-08-22 RX ORDER — FAMOTIDINE 40 MG/1
40 TABLET, FILM COATED ORAL 2 TIMES DAILY
Qty: 180 TABLET | Refills: 1 | Status: SHIPPED | OUTPATIENT
Start: 2024-08-22 | End: 2025-02-18

## 2024-08-22 RX ORDER — SEMAGLUTIDE 1 MG/.5ML
1 INJECTION, SOLUTION SUBCUTANEOUS
Qty: 2 ML | Refills: 3 | Status: SHIPPED | OUTPATIENT
Start: 2024-08-22

## 2024-08-22 ASSESSMENT — PATIENT HEALTH QUESTIONNAIRE - PHQ9
10. IF YOU CHECKED OFF ANY PROBLEMS, HOW DIFFICULT HAVE THESE PROBLEMS MADE IT FOR YOU TO DO YOUR WORK, TAKE CARE OF THINGS AT HOME, OR GET ALONG WITH OTHER PEOPLE: NOT DIFFICULT AT ALL
2. FEELING DOWN, DEPRESSED OR HOPELESS: SEVERAL DAYS
1. LITTLE INTEREST OR PLEASURE IN DOING THINGS: NOT AT ALL
SUM OF ALL RESPONSES TO PHQ9 QUESTIONS 1 AND 2: 1

## 2024-08-22 ASSESSMENT — ENCOUNTER SYMPTOMS: DEPRESSION: 1

## 2024-08-22 NOTE — PROGRESS NOTES
Subjective   Patient ID: Sera Yepez is a 29 y.o. female who presents for Depression and Prediabetes.  Depression        Patient Instructions   1. Class 2 obesity due to excess calories with body mass index (BMI) of 30 in adult, unspecified whether serious comorbidity present  I believe patient will have significant improvement with weight with a weight program like weight watchers, referral placed.  Did better at higher doses of GLP1.     2. Current moderate episode of major depressive disorder without prior episode (CMS/[facility])  Doing well.     3. Prediabetes  Discussed information regarding diet  - Hemoglobin A1c; Future     4. Abnormal thyroid exam  Refilled thyroid meds.     5. Vitamin D deficiency  Also will recheck vitamin D levels  - Vitamin D 25-Hydroxy,Total (for eval of Vitamin D levels); Future       Patient Active Problem List   Diagnosis    Prediabetes    Vitamin D deficiency    Cannabis dependence (Multi)    Major depressive disorder, single episode, moderate (Multi)    Postoperative anemia due to acute blood loss    Class 1 obesity due to excess calories without serious comorbidity with body mass index (BMI) of 30.0 to 30.9 in adult       Social Connections: Not on file       Current Outpatient Medications on File Prior to Visit   Medication Sig Dispense Refill    famotidine (Pepcid) 40 mg tablet Take 1 tablet (40 mg) by mouth 2 times a day. 60 tablet 1    fLuvoxaMINE (Luvox) 25 mg tablet Take 1 tablet (25 mg) by mouth once daily at bedtime. 30 tablet 5    levothyroxine (Synthroid, Levoxyl) 25 mcg tablet TAKE 1/2 TABLET BY MOUTH ONCE DAILY. 45 tablet 1    semaglutide 0.25 mg or 0.5 mg (2 mg/3 mL) pen injector Inject 0.5 mg under the skin 1 (one) time per week. 9 mL 0     No current facility-administered medications on file prior to visit.        Vitals:    08/22/24 1315   BP: 126/82   Pulse: 91   Temp: 36.5 °C (97.7 °F)   SpO2: 100%     Vitals:    08/22/24 1315   Weight: 82.6 kg (182 lb)       Review  of Systems   Psychiatric/Behavioral:  Positive for depression.    All other systems reviewed and are negative.      Objective     Physical Exam  Constitutional:       Appearance: Normal appearance. She is well-developed.   HENT:      Head: Atraumatic.   Cardiovascular:      Rate and Rhythm: Normal rate and regular rhythm.      Heart sounds: Normal heart sounds. No murmur heard.  Pulmonary:      Effort: Pulmonary effort is normal.      Breath sounds: Normal breath sounds.   Abdominal:      General: Bowel sounds are normal.      Palpations: Abdomen is soft.   Skin:     General: Skin is warm.   Neurological:      General: No focal deficit present.      Mental Status: She is alert.   Psychiatric:         Mood and Affect: Mood normal.         No visits with results within 2 Month(s) from this visit.   Latest known visit with results is:   Lab on 03/26/2024   Component Date Value Ref Range Status    Vitamin D, 25-Hydroxy, Total 03/26/2024 18 (L)  30 - 100 ng/mL Final    Thyroid Stimulating Hormone 03/26/2024 5.58 (H)  0.44 - 3.98 mIU/L Final    Hemoglobin A1C 03/26/2024 6.0 (H)  see below % Final    Estimated Average Glucose 03/26/2024 126  Not Established mg/dL Final    Thyroxine, Free 03/26/2024 0.71  0.61 - 1.12 ng/dL Final       Assessment/Plan   Problem List Items Addressed This Visit             ICD-10-CM    Prediabetes - Primary R73.03    Relevant Medications    semaglutide (Rybelsus) 14 mg tablet tablet    semaglutide, weight loss, (Wegovy) 1 mg/0.5 mL pen injector    Other Relevant Orders    CBC and Auto Differential    TSH with reflex to Free T4 if abnormal    Hemoglobin A1C     Other Visit Diagnoses         Codes    Current moderate episode of major depressive disorder without prior episode (Multi)     F32.1    Relevant Medications    fLuvoxaMINE (Luvox) 25 mg tablet    Subclinical hypothyroidism     E03.8    Relevant Medications    levothyroxine (Synthroid, Levoxyl) 25 mcg tablet    Right upper quadrant  abdominal pain     R10.11    Relevant Medications    famotidine (Pepcid) 40 mg tablet          Use Wegovy for now.  Switch to Rybelsus if not covered.  Refilled other meds.  Exercise daily.  Fu in 6 mo

## 2024-09-01 ENCOUNTER — HOSPITAL ENCOUNTER (EMERGENCY)
Facility: HOSPITAL | Age: 29
Discharge: LEFT BEFORE DISCHARGE | End: 2024-09-01
Payer: COMMERCIAL

## 2024-09-01 VITALS
HEIGHT: 64 IN | RESPIRATION RATE: 20 BRPM | DIASTOLIC BLOOD PRESSURE: 97 MMHG | TEMPERATURE: 97.7 F | HEART RATE: 64 BPM | WEIGHT: 175 LBS | SYSTOLIC BLOOD PRESSURE: 141 MMHG | OXYGEN SATURATION: 98 % | BODY MASS INDEX: 29.88 KG/M2

## 2024-09-01 DIAGNOSIS — R10.13 EPIGASTRIC PAIN: Primary | ICD-10-CM

## 2024-09-01 LAB
ALBUMIN SERPL BCP-MCNC: 4.7 G/DL (ref 3.4–5)
ALP SERPL-CCNC: 73 U/L (ref 33–110)
ALT SERPL W P-5'-P-CCNC: 19 U/L (ref 7–45)
ANION GAP SERPL CALC-SCNC: 13 MMOL/L (ref 10–20)
AST SERPL W P-5'-P-CCNC: 19 U/L (ref 9–39)
BASOPHILS # BLD AUTO: 0.05 X10*3/UL (ref 0–0.1)
BASOPHILS NFR BLD AUTO: 0.6 %
BILIRUB SERPL-MCNC: 0.4 MG/DL (ref 0–1.2)
BUN SERPL-MCNC: 10 MG/DL (ref 6–23)
CALCIUM SERPL-MCNC: 9.2 MG/DL (ref 8.6–10.3)
CHLORIDE SERPL-SCNC: 105 MMOL/L (ref 98–107)
CO2 SERPL-SCNC: 23 MMOL/L (ref 21–32)
CREAT SERPL-MCNC: 0.69 MG/DL (ref 0.5–1.05)
EGFRCR SERPLBLD CKD-EPI 2021: >90 ML/MIN/1.73M*2
EOSINOPHIL # BLD AUTO: 0.23 X10*3/UL (ref 0–0.7)
EOSINOPHIL NFR BLD AUTO: 2.8 %
ERYTHROCYTE [DISTWIDTH] IN BLOOD BY AUTOMATED COUNT: 13.8 % (ref 11.5–14.5)
GLUCOSE SERPL-MCNC: 77 MG/DL (ref 74–99)
HCT VFR BLD AUTO: 44.4 % (ref 36–46)
HGB BLD-MCNC: 14.8 G/DL (ref 12–16)
IMM GRANULOCYTES # BLD AUTO: 0.02 X10*3/UL (ref 0–0.7)
IMM GRANULOCYTES NFR BLD AUTO: 0.2 % (ref 0–0.9)
LIPASE SERPL-CCNC: 8 U/L (ref 9–82)
LYMPHOCYTES # BLD AUTO: 1.95 X10*3/UL (ref 1.2–4.8)
LYMPHOCYTES NFR BLD AUTO: 23.8 %
MCH RBC QN AUTO: 28.6 PG (ref 26–34)
MCHC RBC AUTO-ENTMCNC: 33.3 G/DL (ref 32–36)
MCV RBC AUTO: 86 FL (ref 80–100)
MONOCYTES # BLD AUTO: 0.48 X10*3/UL (ref 0.1–1)
MONOCYTES NFR BLD AUTO: 5.8 %
NEUTROPHILS # BLD AUTO: 5.48 X10*3/UL (ref 1.2–7.7)
NEUTROPHILS NFR BLD AUTO: 66.8 %
NRBC BLD-RTO: 0 /100 WBCS (ref 0–0)
PLATELET # BLD AUTO: 324 X10*3/UL (ref 150–450)
POTASSIUM SERPL-SCNC: 3.7 MMOL/L (ref 3.5–5.3)
PROT SERPL-MCNC: 7.6 G/DL (ref 6.4–8.2)
RBC # BLD AUTO: 5.18 X10*6/UL (ref 4–5.2)
SODIUM SERPL-SCNC: 137 MMOL/L (ref 136–145)
WBC # BLD AUTO: 8.2 X10*3/UL (ref 4.4–11.3)

## 2024-09-01 PROCEDURE — 2500000001 HC RX 250 WO HCPCS SELF ADMINISTERED DRUGS (ALT 637 FOR MEDICARE OP): Performed by: PHYSICIAN ASSISTANT

## 2024-09-01 PROCEDURE — 85025 COMPLETE CBC W/AUTO DIFF WBC: CPT | Performed by: PHYSICIAN ASSISTANT

## 2024-09-01 PROCEDURE — 36415 COLL VENOUS BLD VENIPUNCTURE: CPT | Performed by: PHYSICIAN ASSISTANT

## 2024-09-01 PROCEDURE — 99284 EMERGENCY DEPT VISIT MOD MDM: CPT | Mod: 25

## 2024-09-01 PROCEDURE — 2500000004 HC RX 250 GENERAL PHARMACY W/ HCPCS (ALT 636 FOR OP/ED): Performed by: PHYSICIAN ASSISTANT

## 2024-09-01 PROCEDURE — 96361 HYDRATE IV INFUSION ADD-ON: CPT

## 2024-09-01 PROCEDURE — 80053 COMPREHEN METABOLIC PANEL: CPT | Performed by: PHYSICIAN ASSISTANT

## 2024-09-01 PROCEDURE — 96374 THER/PROPH/DIAG INJ IV PUSH: CPT

## 2024-09-01 PROCEDURE — 96372 THER/PROPH/DIAG INJ SC/IM: CPT | Performed by: PHYSICIAN ASSISTANT

## 2024-09-01 PROCEDURE — 83690 ASSAY OF LIPASE: CPT | Performed by: PHYSICIAN ASSISTANT

## 2024-09-01 PROCEDURE — 96375 TX/PRO/DX INJ NEW DRUG ADDON: CPT

## 2024-09-01 RX ORDER — DICYCLOMINE HYDROCHLORIDE 10 MG/ML
20 INJECTION INTRAMUSCULAR ONCE
Status: COMPLETED | OUTPATIENT
Start: 2024-09-01 | End: 2024-09-01

## 2024-09-01 RX ORDER — ALUMINUM HYDROXIDE, MAGNESIUM HYDROXIDE, AND SIMETHICONE 1200; 120; 1200 MG/30ML; MG/30ML; MG/30ML
30 SUSPENSION ORAL ONCE
Status: COMPLETED | OUTPATIENT
Start: 2024-09-01 | End: 2024-09-01

## 2024-09-01 RX ORDER — PANTOPRAZOLE SODIUM 40 MG/10ML
40 INJECTION, POWDER, LYOPHILIZED, FOR SOLUTION INTRAVENOUS ONCE
Status: COMPLETED | OUTPATIENT
Start: 2024-09-01 | End: 2024-09-01

## 2024-09-01 RX ORDER — PROCHLORPERAZINE EDISYLATE 5 MG/ML
10 INJECTION INTRAMUSCULAR; INTRAVENOUS ONCE
Status: COMPLETED | OUTPATIENT
Start: 2024-09-01 | End: 2024-09-01

## 2024-09-01 RX ORDER — LIDOCAINE HYDROCHLORIDE 20 MG/ML
15 SOLUTION OROPHARYNGEAL ONCE
Status: COMPLETED | OUTPATIENT
Start: 2024-09-01 | End: 2024-09-01

## 2024-09-01 ASSESSMENT — PAIN - FUNCTIONAL ASSESSMENT: PAIN_FUNCTIONAL_ASSESSMENT: 0-10

## 2024-09-01 ASSESSMENT — COLUMBIA-SUICIDE SEVERITY RATING SCALE - C-SSRS
6. HAVE YOU EVER DONE ANYTHING, STARTED TO DO ANYTHING, OR PREPARED TO DO ANYTHING TO END YOUR LIFE?: NO
1. IN THE PAST MONTH, HAVE YOU WISHED YOU WERE DEAD OR WISHED YOU COULD GO TO SLEEP AND NOT WAKE UP?: NO
2. HAVE YOU ACTUALLY HAD ANY THOUGHTS OF KILLING YOURSELF?: NO

## 2024-09-01 ASSESSMENT — PAIN SCALES - GENERAL: PAINLEVEL_OUTOF10: 6

## 2024-09-01 NOTE — ED PROVIDER NOTES
HPI   Chief Complaint   Patient presents with    Abdominal Pain       History of present illness: 29-year-old female complains of abdominal pain, vomiting, diarrhea worse over the last week.  Patient states that she has had abdominal discomforts on and off for the past 2 months when she began taking Wegovy for weight loss.  Patient states that she has abdominal cramping in the epigastric region with vomiting twice today.  She is having about 5 episodes of diarrhea daily.  Denies rectal bleeding.  Denies vaginal bleeding vaginal discharge, dysuria, polyuria, lightheadedness, dizziness.  She has Zofran at home which has limited improvement of her nausea.    Review of systems: Constitutional, eye, ENT, cardiovascular, respiratory, gastrointestinal, genitourinary, neurologic, musculoskeletal, dermatologic, hematologic, endocrine systems were evaluated and were negative unless otherwise specified in history of present illness.    Medications: Reviewed and per nursing note.    Family history: Denies relevant medical conditions.    Past medical history: Prediabetes, vitamin D deficiency, depression, anemia    Social history: Denies tobacco, alcohol, drug use.      Physical exam:    Appearance: Well-developed, well-nourished, nontoxic-appearing, alert and oriented x3. Talking in complete sentences.    HEENT:  Head normocephalic atraumatic, extraocular movements intact, pupils equal round reactive to light, mucous membranes are moist and pink.    NECK:  Nml Inspection, no meningismus, no thyromegaly, no lymphadenopathy, no JVD, trachea is midline.    Respiratory: Clear to auscultation bilaterally with normal bilateral excursion. No wheezes, rhonchi, crackles.    Cardiovascular: Regular rate and rhythm, no murmurs, rubs or gallops. Pulses 2+ symmetrically in the dorsalis pedis and radial pulses.    Abdomen/GI:   Epigastric tenderness with no rebound guarding or rigidity.  Soft, nondistended, normal bowel sounds x4. No masses  or organomegaly.    :  No CVA tenderness    Neuro:  Oriented x 3, Speech Clear, cranial nerves grossly intact. Normal sensation to light touch in all 4 extremities.    Musculoskeletal: Patient spontaneously moves all 4 extremities.    Skin:  No cyanosis, clubbing, edema, open wounds, or rashes.                Patient History   Past Medical History:   Diagnosis Date    Candidiasis, unspecified 2021    Yeast infection    Encounter for contraceptive management, unspecified 03/10/2020    Contraception management    Encounter for immunization 10/22/2019    Encounter for vaccination    Encounter for routine postpartum follow-up ([facility]) 03/10/2020    Postpartum exam    Encounter for supervision of normal pregnancy, unspecified, first trimester ([facility]) 2022    First trimester pregnancy    Hemorrhage in early pregnancy, unspecified ([facility]) 2022    Vaginal bleeding affecting early pregnancy    Other prurigo 2019    Pruritic rash    Other specified health status     No pertinent past medical history    Personal history of other complications of pregnancy, childbirth and the puerperium 2022    History of threatened     Personal history of other specified conditions 2022    History of headache    Personal history of other specified conditions 10/22/2019    History of urinary frequency    Personal history of urinary (tract) infections 10/21/2019    History of urinary tract infection     Past Surgical History:   Procedure Laterality Date    OTHER SURGICAL HISTORY  10/21/2019    [place] tooth extraction    OTHER SURGICAL HISTORY  2022     section low transverse     No family history on file.  Social History     Tobacco Use    Smoking status: Never    Smokeless tobacco: Never   Vaping Use    Vaping status: Some Days    Substances: THC, CBD, Flavoring   Substance Use Topics    Alcohol use: Not Currently    Drug use: Yes     Types: Marijuana       Physical Exam   ED  Triage Vitals [09/01/24 1149]   Temperature Heart Rate Respirations BP   36.5 °C (97.7 °F) 64 20 (!) 141/97      Pulse Ox Temp src Heart Rate Source Patient Position   98 % -- -- --      BP Location FiO2 (%)     -- --       Physical Exam      ED Course & MDM   Diagnoses as of 09/01/24 1651   Epigastric pain                 No data recorded     [place] Coma Scale Score: 15 (09/01/24 1150 : Carmine Arenas, EMT)                           Medical Decision Making  Labs Reviewed  LIPASE - Abnormal     Lipase                        8 (*)                      Narrative: Venipuncture immediately after or during the administration of Metamizole may lead to falsely low results. Testing should be performed immediately prior to Metamizole dosing.  COMPREHENSIVE METABOLIC PANEL - Normal     Glucose                       77                     Sodium                        137                    Potassium                     3.7                    Chloride                      105                    Bicarbonate                   23                     Anion Gap                     13                     Urea Nitrogen                 10                     Creatinine                    0.69                   eGFR                          >90                    Calcium                       9.2                    Albumin                       4.7                    Alkaline Phosphatase          73                     Total Protein                 7.6                    AST                           19                     Bilirubin, Total              0.4                    ALT                           19                  CBC WITH AUTO DIFFERENTIAL     WBC                           8.2                    nRBC                          0.0                    RBC                           5.18                   Hemoglobin                    14.8                   Hematocrit                    44.4                   MCV                            86                     MCH                           28.6                   MCHC                          33.3                   RDW                           13.8                   Platelets                     324                    Neutrophils %                 66.8                   Immature Granulocytes %, Automated   0.2                    Lymphocytes %                 23.8                   Monocytes %                   5.8                    Eosinophils %                 2.8                    Basophils %                   0.6                    Neutrophils Absolute          5.48                   Immature Granulocytes Absolute, Au*   0.02                   Lymphocytes Absolute          1.95                   Monocytes Absolute            0.48                   Eosinophils Absolute          0.23                   Basophils Absolute            0.05                URINALYSIS WITH REFLEX CULTURE AND MICROSCOPIC         Narrative: The following orders were created for panel order Urinalysis with Reflex Culture and Microscopic.                  Procedure                               Abnormality         Status                                     ---------                               -----------         ------                                     Urinalysis with Reflex C...[[id number]]                                                                 Extra Urine Gray Tube[[id number]]                                                                                         Please view results for these tests on the individual orders.  HCG, URINE, QUALITATIVE  URINALYSIS WITH REFLEX CULTURE AND MICROSCOPIC  EXTRA URINE GRAY TUBE      Patient complains of abdominal pain vomiting diarrhea.  Differential diagnosis of medication reaction, irritable bowel syndrome, appendicitis, pancreatitis, gastritis, gastric ulcer.  Examination shows some epigastric tenderness.  Afebrile nontoxic-appearing.    CBC CMP lipase  urinalysis hCG ordered.  Ordered 1 L normal saline GI cocktail pantoprazole Compazine Bentyl.    Patient spoke with nursing staff saying that she wanted to leave and did not want to wait for her results.  She was not able to be reevaluated for improvement of her symptoms.  Patient plans on following up with studies at home.  Patient signed AGAINST MEDICAL ADVICE with nursing staff.  Although presentation is most likely secondary to medication reaction of GLP medication, could not sufficiently evaluate other causes.    After patient left results came back and diagnostic studies did show CBC with differential unremarkable, chemistry unremarkable, lipase not elevated.            Procedure  Procedures     Patricio Dasilva PA-C  09/01/24 4211

## 2024-09-10 DIAGNOSIS — E66.09 CLASS 2 OBESITY DUE TO EXCESS CALORIES WITH BODY MASS INDEX (BMI) OF 36.0 TO 36.9 IN ADULT, UNSPECIFIED WHETHER SERIOUS COMORBIDITY PRESENT: Primary | ICD-10-CM

## 2024-09-10 RX ORDER — SEMAGLUTIDE 0.25 MG/.5ML
0.25 INJECTION, SOLUTION SUBCUTANEOUS WEEKLY
Qty: 6 ML | Refills: 1 | Status: SHIPPED | OUTPATIENT
Start: 2024-09-10 | End: 2025-02-19

## 2024-09-10 NOTE — PROGRESS NOTES
Subjective   Patient ID: Sera Yepez is a 29 y.o. female who presents for No chief complaint on file..  HPI    Patient Active Problem List   Diagnosis    Prediabetes    Vitamin D deficiency    Cannabis dependence (Multi)    Major depressive disorder, single episode, moderate (Multi)    Postoperative anemia due to acute blood loss    Class 1 obesity due to excess calories without serious comorbidity with body mass index (BMI) of 30.0 to 30.9 in adult       Social Connections: Not on file       Current Outpatient Medications on File Prior to Visit   Medication Sig Dispense Refill    famotidine (Pepcid) 40 mg tablet Take 1 tablet (40 mg) by mouth 2 times a day. 180 tablet 1    fLuvoxaMINE (Luvox) 25 mg tablet Take 1 tablet (25 mg) by mouth once daily at bedtime. 90 tablet 1    levothyroxine (Synthroid, Levoxyl) 25 mcg tablet Take 0.5 tablets (12.5 mcg) by mouth once daily. 45 tablet 1    semaglutide (Rybelsus) 14 mg tablet tablet Take 1 tablet (14 mg) by mouth once daily. 90 tablet 1    semaglutide, weight loss, (Wegovy) 1 mg/0.5 mL pen injector Inject 1 mg under the skin every 7 days. 2 mL 3     No current facility-administered medications on file prior to visit.        There were no vitals filed for this visit.  There were no vitals filed for this visit.    Review of Systems    Objective     Physical Exam    Admission on 09/01/2024, Discharged on 09/01/2024   Component Date Value Ref Range Status    WBC 09/01/2024 8.2  4.4 - 11.3 x10*3/uL Final    nRBC 09/01/2024 0.0  0.0 - 0.0 /100 WBCs Final    RBC 09/01/2024 5.18  4.00 - 5.20 x10*6/uL Final    Hemoglobin 09/01/2024 14.8  12.0 - 16.0 g/dL Final    Hematocrit 09/01/2024 44.4  36.0 - 46.0 % Final    MCV 09/01/2024 86  80 - 100 fL Final    MCH 09/01/2024 28.6  26.0 - 34.0 pg Final    MCHC 09/01/2024 33.3  32.0 - 36.0 g/dL Final    RDW 09/01/2024 13.8  11.5 - 14.5 % Final    Platelets 09/01/2024 324  150 - 450 x10*3/uL Final    Neutrophils % 09/01/2024 66.8  40.0 -  80.0 % Final    Immature Granulocytes %, Automated 09/01/2024 0.2  0.0 - 0.9 % Final    Immature Granulocyte Count (IG) includes promyelocytes, myelocytes and metamyelocytes but does not include bands. Percent differential counts (%) should be interpreted in the context of the absolute cell counts (cells/UL).    Lymphocytes % 09/01/2024 23.8  13.0 - 44.0 % Final    Monocytes % 09/01/2024 5.8  2.0 - 10.0 % Final    Eosinophils % 09/01/2024 2.8  0.0 - 6.0 % Final    Basophils % 09/01/2024 0.6  0.0 - 2.0 % Final    Neutrophils Absolute 09/01/2024 5.48  1.20 - 7.70 x10*3/uL Final    Percent differential counts (%) should be interpreted in the context of the absolute cell counts (cells/uL).    Immature Granulocytes Absolute, Au* 09/01/2024 0.02  0.00 - 0.70 x10*3/uL Final    Lymphocytes Absolute 09/01/2024 1.95  1.20 - 4.80 x10*3/uL Final    Monocytes Absolute 09/01/2024 0.48  0.10 - 1.00 x10*3/uL Final    Eosinophils Absolute 09/01/2024 0.23  0.00 - 0.70 x10*3/uL Final    Basophils Absolute 09/01/2024 0.05  0.00 - 0.10 x10*3/uL Final    Glucose 09/01/2024 77  74 - 99 mg/dL Final    Sodium 09/01/2024 137  136 - 145 mmol/L Final    Potassium 09/01/2024 3.7  3.5 - 5.3 mmol/L Final    Chloride 09/01/2024 105  98 - 107 mmol/L Final    Bicarbonate 09/01/2024 23  21 - 32 mmol/L Final    Anion Gap 09/01/2024 13  10 - 20 mmol/L Final    Urea Nitrogen 09/01/2024 10  6 - 23 mg/dL Final    Creatinine 09/01/2024 0.69  0.50 - 1.05 mg/dL Final    eGFR 09/01/2024 >90  >60 mL/min/1.73m*2 Final    Calculations of estimated GFR are performed using the 2021 CKD-EPI Study Refit equation without the race variable for the IDMS-Traceable creatinine methods.  https://jasn.asnjournals.org/content/early/2021/09/22/ASN.4864425075    Calcium 09/01/2024 9.2  8.6 - 10.3 mg/dL Final    Albumin 09/01/2024 4.7  3.4 - 5.0 g/dL Final    Alkaline Phosphatase 09/01/2024 73  33 - 110 U/L Final    Total Protein 09/01/2024 7.6  6.4 - 8.2 g/dL Final    AST  09/01/2024 19  9 - 39 U/L Final    Bilirubin, Total 09/01/2024 0.4  0.0 - 1.2 mg/dL Final    ALT 09/01/2024 19  7 - 45 U/L Final    Patients treated with Sulfasalazine may generate falsely decreased results for ALT.    Lipase 09/01/2024 8 (L)  9 - 82 U/L Final       Assessment/Plan

## 2024-09-24 ENCOUNTER — APPOINTMENT (OUTPATIENT)
Dept: PRIMARY CARE | Facility: CLINIC | Age: 29
End: 2024-09-24
Payer: COMMERCIAL

## 2024-09-27 ENCOUNTER — APPOINTMENT (OUTPATIENT)
Dept: OBSTETRICS AND GYNECOLOGY | Facility: CLINIC | Age: 29
End: 2024-09-27
Payer: COMMERCIAL

## 2024-11-12 ENCOUNTER — APPOINTMENT (OUTPATIENT)
Dept: PRIMARY CARE | Facility: CLINIC | Age: 29
End: 2024-11-12
Payer: COMMERCIAL

## 2024-11-12 ENCOUNTER — E-VISIT (OUTPATIENT)
Dept: PRIMARY CARE | Facility: CLINIC | Age: 29
End: 2024-11-12
Payer: COMMERCIAL

## 2024-11-12 DIAGNOSIS — R11.0 NAUSEA: Primary | ICD-10-CM

## 2024-11-12 RX ORDER — ONDANSETRON 4 MG/1
4 TABLET, FILM COATED ORAL EVERY 8 HOURS PRN
Qty: 30 TABLET | Refills: 2 | Status: SHIPPED | OUTPATIENT
Start: 2024-11-12 | End: 2024-12-12

## 2025-01-07 ENCOUNTER — TELEPHONE (OUTPATIENT)
Dept: PRIMARY CARE | Facility: CLINIC | Age: 30
End: 2025-01-07
Payer: COMMERCIAL

## 2025-01-31 LAB
BASOPHILS # BLD AUTO: 50 CELLS/UL (ref 0–200)
BASOPHILS NFR BLD AUTO: 0.7 %
EOSINOPHIL # BLD AUTO: 199 CELLS/UL (ref 15–500)
EOSINOPHIL NFR BLD AUTO: 2.8 %
ERYTHROCYTE [DISTWIDTH] IN BLOOD BY AUTOMATED COUNT: 13 % (ref 11–15)
EST. AVERAGE GLUCOSE BLD GHB EST-MCNC: 103 MG/DL
EST. AVERAGE GLUCOSE BLD GHB EST-SCNC: 5.7 MMOL/L
HBA1C MFR BLD: 5.2 % OF TOTAL HGB
HCT VFR BLD AUTO: 41.1 % (ref 35–45)
HGB BLD-MCNC: 13.4 G/DL (ref 11.7–15.5)
LYMPHOCYTES # BLD AUTO: 2393 CELLS/UL (ref 850–3900)
LYMPHOCYTES NFR BLD AUTO: 33.7 %
MCH RBC QN AUTO: 30 PG (ref 27–33)
MCHC RBC AUTO-ENTMCNC: 32.6 G/DL (ref 32–36)
MCV RBC AUTO: 91.9 FL (ref 80–100)
MONOCYTES # BLD AUTO: 533 CELLS/UL (ref 200–950)
MONOCYTES NFR BLD AUTO: 7.5 %
NEUTROPHILS # BLD AUTO: 3926 CELLS/UL (ref 1500–7800)
NEUTROPHILS NFR BLD AUTO: 55.3 %
PLATELET # BLD AUTO: 258 THOUSAND/UL (ref 140–400)
PMV BLD REES-ECKER: 12.1 FL (ref 7.5–12.5)
RBC # BLD AUTO: 4.47 MILLION/UL (ref 3.8–5.1)
T4 FREE SERPL-MCNC: 1.1 NG/DL (ref 0.8–1.8)
TSH SERPL-ACNC: 4.61 MIU/L
WBC # BLD AUTO: 7.1 THOUSAND/UL (ref 3.8–10.8)

## 2025-02-24 ENCOUNTER — APPOINTMENT (OUTPATIENT)
Dept: PRIMARY CARE | Facility: CLINIC | Age: 30
End: 2025-02-24
Payer: COMMERCIAL

## 2025-03-26 ENCOUNTER — APPOINTMENT (OUTPATIENT)
Dept: PRIMARY CARE | Facility: CLINIC | Age: 30
End: 2025-03-26